# Patient Record
Sex: MALE | Race: AMERICAN INDIAN OR ALASKA NATIVE
[De-identification: names, ages, dates, MRNs, and addresses within clinical notes are randomized per-mention and may not be internally consistent; named-entity substitution may affect disease eponyms.]

---

## 2017-05-26 ENCOUNTER — HOSPITAL ENCOUNTER (EMERGENCY)
Dept: HOSPITAL 5 - ED | Age: 22
Discharge: HOME | End: 2017-05-26
Payer: COMMERCIAL

## 2017-05-26 DIAGNOSIS — K52.29: Primary | ICD-10-CM

## 2017-05-26 DIAGNOSIS — F17.200: ICD-10-CM

## 2017-05-26 DIAGNOSIS — N30.01: ICD-10-CM

## 2017-05-26 LAB
ALBUMIN SERPL-MCNC: 4.7 G/DL (ref 3.9–5)
ALBUMIN/GLOB SERPL: 1.5 %
ALP SERPL-CCNC: 83 UNITS/L (ref 35–129)
ALT SERPL-CCNC: 19 UNITS/L (ref 7–56)
ANION GAP SERPL CALC-SCNC: 23 MMOL/L
BASOPHILS NFR BLD AUTO: 0.2 % (ref 0–1.8)
BILIRUB SERPL-MCNC: 0.6 MG/DL (ref 0.1–1.2)
BILIRUB UR QL STRIP: (no result)
BLOOD UR QL VISUAL: (no result)
BUN SERPL-MCNC: 14 MG/DL (ref 9–20)
BUN/CREAT SERPL: 17.5 %
CALCIUM SERPL-MCNC: 9.8 MG/DL (ref 8.4–10.2)
CHLORIDE SERPL-SCNC: 96.5 MMOL/L (ref 98–107)
CO2 SERPL-SCNC: 24 MMOL/L (ref 22–30)
EOSINOPHIL NFR BLD AUTO: 0 % (ref 0–4.3)
GLUCOSE SERPL-MCNC: 125 MG/DL (ref 75–100)
HCT VFR BLD CALC: 46.6 % (ref 35.5–45.6)
HGB BLD-MCNC: 16 GM/DL (ref 11.8–15.2)
KETONES UR STRIP-MCNC: 80 MG/DL
LEUKOCYTE ESTERASE UR QL STRIP: (no result)
LIPASE SERPL-CCNC: 97 UNITS/L (ref 13–60)
MCH RBC QN AUTO: 32 PG (ref 28–32)
MCHC RBC AUTO-ENTMCNC: 34 % (ref 32–34)
MCV RBC AUTO: 92 FL (ref 84–94)
MUCOUS THREADS #/AREA URNS HPF: (no result) /HPF
NITRITE UR QL STRIP: (no result)
PH UR STRIP: 6 [PH] (ref 5–7)
PLATELET # BLD: 216 K/MM3 (ref 140–440)
POTASSIUM SERPL-SCNC: 4.2 MMOL/L (ref 3.6–5)
PROT SERPL-MCNC: 7.9 G/DL (ref 6.3–8.2)
RBC # BLD AUTO: 5.07 M/MM3 (ref 3.65–5.03)
RBC #/AREA URNS HPF: 3 /HPF (ref 0–6)
SODIUM SERPL-SCNC: 139 MMOL/L (ref 137–145)
UROBILINOGEN UR-MCNC: < 2 MG/DL (ref ?–2)
WBC # BLD AUTO: 10.4 K/MM3 (ref 4.5–11)
WBC #/AREA URNS HPF: 46 /HPF (ref 0–6)

## 2017-05-26 PROCEDURE — 81001 URINALYSIS AUTO W/SCOPE: CPT

## 2017-05-26 PROCEDURE — 96374 THER/PROPH/DIAG INJ IV PUSH: CPT

## 2017-05-26 PROCEDURE — 96361 HYDRATE IV INFUSION ADD-ON: CPT

## 2017-05-26 PROCEDURE — 99283 EMERGENCY DEPT VISIT LOW MDM: CPT

## 2017-05-26 PROCEDURE — 83690 ASSAY OF LIPASE: CPT

## 2017-05-26 PROCEDURE — 85025 COMPLETE CBC W/AUTO DIFF WBC: CPT

## 2017-05-26 PROCEDURE — 96375 TX/PRO/DX INJ NEW DRUG ADDON: CPT

## 2017-05-26 PROCEDURE — 80053 COMPREHEN METABOLIC PANEL: CPT

## 2017-05-26 PROCEDURE — 36415 COLL VENOUS BLD VENIPUNCTURE: CPT

## 2017-05-26 NOTE — EMERGENCY DEPARTMENT REPORT
HPI





- General


Chief Complaint: Nausea/Vomiting/Diarrhea





- HPI


HPI: 


Patient is a 21-year-old male presents to the ED complaining of vomiting 1 

day.  Patient states yesterday he had [employer] For lunch.  Patient states he was 

unable to finish half of the burger and some fries


Patient says about 20 minutes after eating the burger he started vomiting.  

Patient states he vomited several times that day.  Patient states he vomited 

several times during the night.  Patient states he also vomited several times 

early this morning and decided to come into the ER.


Patient admits generalized mild abdominal pain.


Patient denies fevers/chills/nausea/vomiting/chest pain/shortness of breath/

diarrhea








ED Past Medical Hx





- Past Medical History


Previous Medical History?: No





- Surgical History


Past Surgical History?: No





- Social History


Smoking Status: Current Every Day Smoker


Substance Use Type: Alcohol





- Medications


Home Medications: 


 Home Medications











 Medication  Instructions  Recorded  Confirmed  Last Taken  Type


 


Ondansetron [Zofran Odt] 4 mg PO Q8H #10 tab.rapdis 05/26/17  Unknown Rx














ED Review of Systems


ROS: 


Stated complaint: VOMITING


Other details as noted in HPI





Constitutional: denies: chills, fever


Eyes: denies: eye pain, eye discharge, vision change


ENT: denies: ear pain, throat pain


Respiratory: denies: cough, shortness of breath, wheezing


Cardiovascular: denies: chest pain, palpitations


Endocrine: no symptoms reported


Gastrointestinal: vomiting.  denies: abdominal pain, nausea, diarrhea


Genitourinary: denies: urgency, dysuria, frequency, hematuria, testicular pain, 

testicular mass


Musculoskeletal: denies: back pain, joint swelling, arthralgia


Skin: denies: rash, lesions


Neurological: denies: headache, weakness, paresthesias


Psychiatric: denies: anxiety, depression


Hematological/Lymphatic: denies: easy bleeding, easy bruising





Physical Exam





- Physical Exam


Vital Signs: 


 Vital Signs











  05/26/17 05/26/17





  12:06 12:50


 


Temperature 98.7 F 98.6 F


 


Pulse Rate 56 L 61


 


Respiratory 17 16





Rate  


 


Blood Pressure 137/89 137/91


 


O2 Sat by Pulse 100 100





Oximetry  











Physical Exam: 


GENERAL: Alert and oriented x3, no apparent distress, Normal Gait, atraumatic.


HEAD: Head is normocephalic and a-traumatic.


EYES: Extra ocular muscles are intact. Pupils are equal, round, and reactive to 

light and accommodation.





NECK: Supple. Non edematous, No carotid bruits.  No lymphadenopathy or 

thyromegaly.  No C-spine tenderness


LUNGS: Symetrical with respiration, No wheezing, no rales or crackles, CTAB.


HEART:  S1, S2 present, regular rate and rhythm without murmur, no rubs, no 

gallops.


ABDOMEN: No organomegaly was noted,Positive bowel sounds, soft, and non-

distended.  . Mild tender to palpation on all Quadrants, NO CVA tenderness.


EXTREMITIES/MUSCULOSKELETAL: No cyanosis, clubbing, rash, lesions or edema. 

Full ROM bilaterally. UE/LE Pulses 2+ bilaterally. 


NEUROLOGIC:  The patient is cooperative with no focal neurologic deficits. 

Cranial nerves II through XII are grossly intact. Normal speech. 


PSYCHIATRIC: Mood is congruent with affect, denies suicidal or homicidal 

ideations.


SKIN:  Warm and dry, No lesions, No ulceration or induration present.














ED Course


 Vital Signs











  05/26/17 05/26/17





  12:06 12:50


 


Temperature 98.7 F 98.6 F


 


Pulse Rate 56 L 61


 


Respiratory 17 16





Rate  


 


Blood Pressure 137/89 137/91


 


O2 Sat by Pulse 100 100





Oximetry  














ED Medical Decision Making





- Lab Data


Result diagrams: 


 05/26/17 12:13





 05/26/17 12:13





- Medical Decision Making


21-year-old male presents with a urinary tract infection/gastroenteritis


ED course: CBC, CMP, urinalysis ordered.  CBC within normal limits CMP elevated 

glucose urinary high specific gravity positive mucus positive leukocyte 

esterase suggestive of a UTI


Discussed all findings of patient.  1.  Patient will receive normal saline 1 L, 

Rocephin 500, and morphine 2 mg


Patient Better after Receiving Administration of IV Fluids and Medication.


Usual tract infection with treated with IV Rocephin.  Patient will not need 

outpatient antibiotic treatment.


Discussed the patient will follow up with primary care physician.  Discussed 

elevated glucose and blood in urine the patient.  


Patient states he is not a diabetic.  Discussed the patient to follow-up to be 

screened.


She is alert and oriented 3 patient is in no acute distress whatsoever.


He is conversing appropriately with no problems.





Critical care attestation.: 


If time is entered above; I have spent that time in minutes in the direct care 

of this critically ill patient, excluding procedure time.








ED Disposition


Clinical Impression: 


 Allergic gastroenteritis, Acute vomiting





UTI (urinary tract infection)


Qualifiers:


 Urinary tract infection type: acute cystitis Hematuria presence: with 

hematuria Qualified Code(s): N30.01 - Acute cystitis with hematuria





Disposition: DISCHARGED TO HOME OR SELFCARE


Is pt being admited?: No


Does the pt Need Aspirin: No


Condition: Stable


Instructions:  Food Allergy (ED), Food Poisoning (ED), Acute Nausea and 

Vomiting (ED), Gastroenteritis (ED)


Additional Instructions: 


Follow-up with her primary care physician.


If worsening symptoms develop such as fever, diarrhea or any other problems 

return to ED


Medication as prescribed, increase her water intake 8 to 10 glasses per day.


Prescriptions: 


Ondansetron [Zofran Odt] 4 mg PO Q8H #10 tab.rapdis


Referrals: 


PRIMARY CARE,MD [Primary Care Provider] - 3-5 Days


SAMANTHA LIN MD [Referring] - 3-5 Days


[facility] [Outside] - 3-5 Days


[facility] [Outside] - 3-5 Days


Forms:  Accompanied Note, Work/School Release Form(ED)

## 2017-05-27 VITALS — DIASTOLIC BLOOD PRESSURE: 78 MMHG | SYSTOLIC BLOOD PRESSURE: 125 MMHG

## 2017-07-28 ENCOUNTER — HOSPITAL ENCOUNTER (EMERGENCY)
Dept: HOSPITAL 5 - ED | Age: 22
Discharge: HOME | End: 2017-07-28
Payer: SELF-PAY

## 2017-07-28 VITALS — DIASTOLIC BLOOD PRESSURE: 75 MMHG | SYSTOLIC BLOOD PRESSURE: 124 MMHG

## 2017-07-28 DIAGNOSIS — J02.9: Primary | ICD-10-CM

## 2017-07-28 DIAGNOSIS — F17.200: ICD-10-CM

## 2017-07-28 PROCEDURE — 99283 EMERGENCY DEPT VISIT LOW MDM: CPT

## 2017-07-28 PROCEDURE — 96372 THER/PROPH/DIAG INJ SC/IM: CPT

## 2017-07-28 PROCEDURE — 93010 ELECTROCARDIOGRAM REPORT: CPT

## 2017-07-28 PROCEDURE — 71010: CPT

## 2017-07-28 PROCEDURE — 93005 ELECTROCARDIOGRAM TRACING: CPT

## 2017-07-28 NOTE — XRAY REPORT
FINAL REPORT



PROCEDURE:  XR CHEST 1V AP



TECHNIQUE:  Chest radiograph anteroposterior view. CPT 77992







HISTORY:  Chest pain 



COMPARISON:  No prior studies are available for comparison.



FINDINGS:  

Heart: Normal.



Mediastinum/Vessels: Normal.



Lungs/Pleural space: Normal.



Bony thorax: No acute osseous abnormality.



Life support devices: None.



IMPRESSION:  

Negative exam..

## 2017-07-28 NOTE — EMERGENCY DEPARTMENT REPORT
ED General Adult HPI





- General


Chief complaint: Sore Throat


Stated complaint: CHEST PAIN


Time Seen by Provider: 17 21:18


Source: patient


Mode of arrival: Ambulatory


Limitations: No Limitations





- History of Present Illness


Initial comments: 


sore throat fever x 5 days , cough and chest pain x 3 days cp started after 

cough and post nasal drip 





Onset/Timin


-: days(s)


Severity scale (0 -10): 7


Quality: burning, aching, sharp


Consistency: constant


Improves with: none


Worsens with: other (swallowing )


Associated Symptoms: chest pain (cp with cough only no mancini no sob ), cough, 

fever/chills, malaise.  denies: confusion, diaphoresis, headaches, loss of 

appetite, nausea/vomiting, rash, seizure, shortness of breath, syncope, weakness


Treatments Prior to Arrival: none





- Related Data


 Previous Rx's











 Medication  Instructions  Recorded  Last Taken  Type


 


Ondansetron [Zofran Odt] 4 mg PO Q8H #10 tab.rapdis 17 Unknown Rx


 


Benzocaine/Menth/Cetylpyrd 8 each MM 2XWHS #3 packet 17 Unknown Rx





[Cepacol X Strength]    


 


Ibuprofen [Motrin 800 MG tab] 800 mg PO Q8HR PRN #30 tablet 17 Unknown Rx











 Allergies











Allergy/AdvReac Type Severity Reaction Status Date / Time


 


No Known Allergies Allergy   Unverified 17 12:04














ED Review of Systems


ROS: 


Stated complaint: CHEST PAIN


Other details as noted in HPI





Constitutional: denies: chills, fever


Eyes: denies: eye pain, eye discharge, vision change


ENT: other (sore throat).  denies: ear pain, throat pain


Respiratory: other (chest wall pain with cough ).  denies: cough, shortness of 

breath, wheezing


Cardiovascular: chest pain (with cough ).  denies: palpitations, dyspnea on 

exertion, edema, syncope, paroxysmal nocturnal dyspnea


Endocrine: no symptoms reported


Gastrointestinal: denies: abdominal pain, nausea, diarrhea, constipation, 

hematemesis, melena, hematochezia


Genitourinary: denies: urgency, dysuria


Musculoskeletal: denies: back pain, joint swelling, arthralgia


Skin: denies: rash, lesions


Neurological: denies: headache, weakness, paresthesias


Psychiatric: denies: anxiety, depression


Hematological/Lymphatic: denies: easy bleeding, easy bruising





ED Past Medical Hx





- Past Medical History


Previous Medical History?: No





- Surgical History


Past Surgical History?: No





- Social History


Smoking Status: Current Every Day Smoker


Substance Use Type: Alcohol





- Medications


Home Medications: 


 Home Medications











 Medication  Instructions  Recorded  Confirmed  Last Taken  Type


 


Ondansetron [Zofran Odt] 4 mg PO Q8H #10 tab.rapdis 17  Unknown Rx


 


Benzocaine/Menth/Cetylpyrd 8 each MM 2XWHS #3 packet 17  Unknown Rx





[Cepacol X Strength]     


 


Ibuprofen [Motrin 800 MG tab] 800 mg PO Q8HR PRN #30 tablet 17  Unknown Rx














ED Physical Exam





- General


Limitations: No Limitations


General appearance: alert, in no apparent distress





- Head


Head exam: Present: atraumatic





- Eye


Eye exam: Present: normal appearance, PERRL, EOMI


Pupils: Present: normal accommodation





- ENT


ENT exam: Present: TM's normal bilaterally, normal external ear exam





- Expanded ENT Exam


  ** Expanded


Mouth exam: Present: normal external inspection, tongue normal, other (

tonsilarmegaly erythema white exudate, swelling, uvula midline ).  Absent: 

trismus, tongue elevation, laceration


Teeth exam: Present: normal inspection


Throat exam: Positive: tonsillar erythema, tonsillomegaly, tonsillar exudate.  

Negative: R peritonsillar mass, L peritonsillar mass





- Neck


Neck exam: Present: normal inspection, full ROM, lymphadenopathy.  Absent: 

tenderness, thyromegaly





- Respiratory


Respiratory exam: Present: normal lung sounds bilaterally, chest wall 

tenderness.  Absent: respiratory distress, wheezes, rales, rhonchi, stridor, 

accessory muscle use, decreased breath sounds, prolonged expiratory





- Cardiovascular


Cardiovascular Exam: Present: regular rate, normal rhythm.  Absent: systolic 

murmur, diastolic murmur, rubs, gallop





- GI/Abdominal


GI/Abdominal exam: Present: soft, normal bowel sounds





- Rectal


Rectal exam: Present: deferred





- Extremities Exam


Extremities exam: Present: normal inspection, full ROM





- Back Exam


Back exam: Present: normal inspection, full ROM.  Absent: tenderness, CVA 

tenderness (R), CVA tenderness (L), muscle spasm, paraspinal tenderness, 

vertebral tenderness, rash noted





- Neurological Exam


Neurological exam: Present: alert, oriented X3, CN II-XII intact, normal gait, 

reflexes normal.  Absent: motor sensory deficit





- Psychiatric


Psychiatric exam: Present: normal affect, normal mood





- Skin


Skin exam: Present: warm, dry, intact.  Absent: rash





ED Course





 Vital Signs











  17





  20:10 20:20


 


Temperature 100.3 F H 


 


Pulse Rate 97 H 


 


Respiratory 20 20





Rate  


 


Blood Pressure 124/75 


 


O2 Sat by Pulse 97 





Oximetry  














ED Medical Decision Making





- Radiology Data


Radiology results: image reviewed


no opacities no infiltrates 








- Medical Decision Making


pt is a 22 y/o aam with nmh who presents for throat pain and dysphagia  5 days 

sudden onset after sick contact child, pt endorses malaise loss of appetite 

cough and cp with cough pt denies sob no n/v no mancini no pnd, exam: pharynx: 

moderate erythema white exudate, tonsilarmegaly swelling, uvula midline airway 

patent, no stridor lungs clear bilat no wheezing , cxr: normal ,  plan: decadron

, bicillin IM, dc to self with ibuprofen, cepachol throat lozenges pt will 

return to emegency if symptoms not improving, 





Critical care attestation.: 


If time is entered above; I have spent that time in minutes in the direct care 

of this critically ill patient, excluding procedure time.








ED Disposition


Clinical Impression: 


 Bacterial pharyngitis





Disposition: DC-01 TO HOME OR SELFCARE


Is pt being admited?: No


Does the pt Need Aspirin: No


Condition: Good


Instructions:  Pharyngitis (ED)


Prescriptions: 


Benzocaine/Menth/Cetylpyrd [Cepacol X Strength] 8 each MM 2XWHS #3 packet


Ibuprofen [Motrin 800 MG tab] 800 mg PO Q8HR PRN #30 tablet


 PRN Reason: pain


Referrals: 


PRIMARY CARE,MD [Primary Care Provider] - 3-5 Days


Forms:  Work/School Release Form(ED)


Time of Disposition: 21:42

## 2018-01-05 ENCOUNTER — HOSPITAL ENCOUNTER (EMERGENCY)
Dept: HOSPITAL 5 - ED | Age: 23
Discharge: LEFT BEFORE DISCHARGE | End: 2018-01-05
Payer: SELF-PAY

## 2018-01-05 VITALS — DIASTOLIC BLOOD PRESSURE: 91 MMHG | SYSTOLIC BLOOD PRESSURE: 135 MMHG

## 2018-01-05 DIAGNOSIS — Z53.21: Primary | ICD-10-CM

## 2018-01-05 LAB
ALBUMIN SERPL-MCNC: 5.4 G/DL (ref 3.9–5)
ALT SERPL-CCNC: 15 UNITS/L (ref 7–56)
BACTERIA #/AREA URNS HPF: (no result) /HPF
BASOPHILS # (AUTO): 0.1 K/MM3 (ref 0–0.1)
BASOPHILS NFR BLD AUTO: 0.5 % (ref 0–1.8)
BILIRUB UR QL STRIP: (no result)
BLOOD UR QL VISUAL: (no result)
BUN SERPL-MCNC: 12 MG/DL (ref 9–20)
BUN/CREAT SERPL: 15 %
CALCIUM SERPL-MCNC: 10 MG/DL (ref 8.4–10.2)
EOSINOPHIL # BLD AUTO: 0 K/MM3 (ref 0–0.4)
EOSINOPHIL NFR BLD AUTO: 0 % (ref 0–4.3)
HCT VFR BLD CALC: 47.5 % (ref 35.5–45.6)
HEMOLYSIS INDEX: 10
HGB BLD-MCNC: 15.8 GM/DL (ref 11.8–15.2)
LYMPHOCYTES # BLD AUTO: 0.7 K/MM3 (ref 1.2–5.4)
LYMPHOCYTES NFR BLD AUTO: 6.1 % (ref 13.4–35)
MCH RBC QN AUTO: 31 PG (ref 28–32)
MCHC RBC AUTO-ENTMCNC: 33 % (ref 32–34)
MCV RBC AUTO: 92 FL (ref 84–94)
MONOCYTES # (AUTO): 0.5 K/MM3 (ref 0–0.8)
MONOCYTES % (AUTO): 4.8 % (ref 0–7.3)
MUCOUS THREADS #/AREA URNS HPF: (no result) /HPF
NITRITE UR QL STRIP: (no result)
PH UR STRIP: 6 [PH] (ref 5–7)
PLATELET # BLD: 179 K/MM3 (ref 140–440)
RBC # BLD AUTO: 5.15 M/MM3 (ref 3.65–5.03)
RBC #/AREA URNS HPF: 17 /HPF (ref 0–6)
UROBILINOGEN UR-MCNC: < 2 MG/DL (ref ?–2)
WBC #/AREA URNS HPF: 41 /HPF (ref 0–6)

## 2018-01-05 PROCEDURE — 80053 COMPREHEN METABOLIC PANEL: CPT

## 2018-01-05 PROCEDURE — 85025 COMPLETE CBC W/AUTO DIFF WBC: CPT

## 2018-01-05 PROCEDURE — 81001 URINALYSIS AUTO W/SCOPE: CPT

## 2018-01-05 PROCEDURE — 36415 COLL VENOUS BLD VENIPUNCTURE: CPT

## 2018-01-07 ENCOUNTER — HOSPITAL ENCOUNTER (EMERGENCY)
Dept: HOSPITAL 5 - ED | Age: 23
Discharge: LEFT BEFORE DISCHARGE | End: 2018-01-07
Payer: SELF-PAY

## 2018-01-07 VITALS — SYSTOLIC BLOOD PRESSURE: 107 MMHG | DIASTOLIC BLOOD PRESSURE: 69 MMHG
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
## 2018-01-07 DIAGNOSIS — Z53.21: ICD-10-CM
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
## 2018-01-07 DIAGNOSIS — T62.91XA: Primary | ICD-10-CM

## 2024-05-26 ENCOUNTER — P2P PATIENT RECORD (OUTPATIENT)
Age: 29
End: 2024-05-26